# Patient Record
Sex: MALE | Race: BLACK OR AFRICAN AMERICAN
[De-identification: names, ages, dates, MRNs, and addresses within clinical notes are randomized per-mention and may not be internally consistent; named-entity substitution may affect disease eponyms.]

---

## 2021-03-05 ENCOUNTER — HOSPITAL ENCOUNTER (OUTPATIENT)
Dept: HOSPITAL 35 - LAB | Age: 66
End: 2021-03-05
Attending: SURGERY
Payer: COMMERCIAL

## 2021-03-05 DIAGNOSIS — Z01.812: Primary | ICD-10-CM

## 2021-03-05 DIAGNOSIS — Z20.822: ICD-10-CM

## 2021-03-10 ENCOUNTER — HOSPITAL ENCOUNTER (OUTPATIENT)
Dept: HOSPITAL 35 - OR | Age: 66
Discharge: HOME | End: 2021-03-10
Attending: SURGERY
Payer: COMMERCIAL

## 2021-03-10 VITALS — BODY MASS INDEX: 32.34 KG/M2 | HEIGHT: 74 IN | WEIGHT: 252 LBS

## 2021-03-10 VITALS — DIASTOLIC BLOOD PRESSURE: 64 MMHG | SYSTOLIC BLOOD PRESSURE: 117 MMHG

## 2021-03-10 DIAGNOSIS — Z98.890: ICD-10-CM

## 2021-03-10 DIAGNOSIS — Z79.82: ICD-10-CM

## 2021-03-10 DIAGNOSIS — I10: ICD-10-CM

## 2021-03-10 DIAGNOSIS — Z79.899: ICD-10-CM

## 2021-03-10 DIAGNOSIS — E11.9: ICD-10-CM

## 2021-03-10 DIAGNOSIS — K42.0: Primary | ICD-10-CM

## 2021-03-10 DIAGNOSIS — J45.909: ICD-10-CM

## 2021-03-10 DIAGNOSIS — G47.30: ICD-10-CM

## 2021-03-10 DIAGNOSIS — E78.00: ICD-10-CM

## 2021-03-10 LAB
ANION GAP SERPL CALC-SCNC: 6 MMOL/L (ref 7–16)
BUN SERPL-MCNC: 16 MG/DL (ref 7–18)
CALCIUM SERPL-MCNC: 9.1 MG/DL (ref 8.5–10.1)
CHLORIDE SERPL-SCNC: 106 MMOL/L (ref 98–107)
CO2 SERPL-SCNC: 30 MMOL/L (ref 21–32)
CREAT SERPL-MCNC: 1.3 MG/DL (ref 0.7–1.3)
GLUCOSE SERPL-MCNC: 150 MG/DL (ref 74–106)
POTASSIUM SERPL-SCNC: 4.1 MMOL/L (ref 3.5–5.1)
SODIUM SERPL-SCNC: 142 MMOL/L (ref 136–145)

## 2021-03-10 PROCEDURE — 62850: CPT

## 2021-03-10 PROCEDURE — 56524: CPT

## 2021-03-10 PROCEDURE — 56525: CPT

## 2021-03-10 PROCEDURE — 70005: CPT

## 2021-03-10 PROCEDURE — 54118: CPT

## 2021-03-10 PROCEDURE — 56526: CPT

## 2021-03-10 PROCEDURE — 50386 REMOVE STENT VIA TRANSURETH: CPT

## 2021-03-10 PROCEDURE — 50417: CPT

## 2021-03-10 PROCEDURE — 50010 RENAL EXPLORATION: CPT

## 2021-03-10 PROCEDURE — 50130 PYELOTOMY W/REMOVAL CALCULUS: CPT

## 2021-03-10 PROCEDURE — 50101: CPT

## 2021-03-10 PROCEDURE — 62110: CPT

## 2021-03-17 ENCOUNTER — HOSPITAL ENCOUNTER (INPATIENT)
Dept: HOSPITAL 35 - ER | Age: 66
LOS: 5 days | Discharge: HOME | DRG: 853 | End: 2021-03-22
Attending: INTERNAL MEDICINE | Admitting: INTERNAL MEDICINE
Payer: COMMERCIAL

## 2021-03-17 VITALS — DIASTOLIC BLOOD PRESSURE: 71 MMHG | SYSTOLIC BLOOD PRESSURE: 108 MMHG

## 2021-03-17 VITALS — SYSTOLIC BLOOD PRESSURE: 94 MMHG | DIASTOLIC BLOOD PRESSURE: 61 MMHG

## 2021-03-17 VITALS — DIASTOLIC BLOOD PRESSURE: 66 MMHG | SYSTOLIC BLOOD PRESSURE: 95 MMHG

## 2021-03-17 VITALS — DIASTOLIC BLOOD PRESSURE: 55 MMHG | SYSTOLIC BLOOD PRESSURE: 91 MMHG

## 2021-03-17 VITALS — HEIGHT: 75 IN | WEIGHT: 243 LBS | BODY MASS INDEX: 30.21 KG/M2

## 2021-03-17 VITALS — SYSTOLIC BLOOD PRESSURE: 100 MMHG | DIASTOLIC BLOOD PRESSURE: 65 MMHG

## 2021-03-17 VITALS — SYSTOLIC BLOOD PRESSURE: 100 MMHG | DIASTOLIC BLOOD PRESSURE: 66 MMHG

## 2021-03-17 VITALS — SYSTOLIC BLOOD PRESSURE: 112 MMHG | DIASTOLIC BLOOD PRESSURE: 66 MMHG

## 2021-03-17 VITALS — SYSTOLIC BLOOD PRESSURE: 110 MMHG | DIASTOLIC BLOOD PRESSURE: 75 MMHG

## 2021-03-17 VITALS — SYSTOLIC BLOOD PRESSURE: 101 MMHG | DIASTOLIC BLOOD PRESSURE: 63 MMHG

## 2021-03-17 VITALS — SYSTOLIC BLOOD PRESSURE: 115 MMHG | DIASTOLIC BLOOD PRESSURE: 73 MMHG

## 2021-03-17 VITALS — DIASTOLIC BLOOD PRESSURE: 80 MMHG | SYSTOLIC BLOOD PRESSURE: 98 MMHG

## 2021-03-17 DIAGNOSIS — E78.5: ICD-10-CM

## 2021-03-17 DIAGNOSIS — I95.9: ICD-10-CM

## 2021-03-17 DIAGNOSIS — E11.22: ICD-10-CM

## 2021-03-17 DIAGNOSIS — N17.9: ICD-10-CM

## 2021-03-17 DIAGNOSIS — I27.21: ICD-10-CM

## 2021-03-17 DIAGNOSIS — Z79.84: ICD-10-CM

## 2021-03-17 DIAGNOSIS — Z95.5: ICD-10-CM

## 2021-03-17 DIAGNOSIS — D62: ICD-10-CM

## 2021-03-17 DIAGNOSIS — A41.9: Primary | ICD-10-CM

## 2021-03-17 DIAGNOSIS — I26.02: ICD-10-CM

## 2021-03-17 DIAGNOSIS — Z79.82: ICD-10-CM

## 2021-03-17 DIAGNOSIS — J96.01: ICD-10-CM

## 2021-03-17 DIAGNOSIS — K72.00: ICD-10-CM

## 2021-03-17 DIAGNOSIS — E78.00: ICD-10-CM

## 2021-03-17 DIAGNOSIS — I12.9: ICD-10-CM

## 2021-03-17 DIAGNOSIS — R77.8: ICD-10-CM

## 2021-03-17 DIAGNOSIS — N39.0: ICD-10-CM

## 2021-03-17 DIAGNOSIS — I25.10: ICD-10-CM

## 2021-03-17 DIAGNOSIS — Z20.822: ICD-10-CM

## 2021-03-17 DIAGNOSIS — E87.1: ICD-10-CM

## 2021-03-17 DIAGNOSIS — D69.6: ICD-10-CM

## 2021-03-17 DIAGNOSIS — N18.9: ICD-10-CM

## 2021-03-17 DIAGNOSIS — Z79.899: ICD-10-CM

## 2021-03-17 DIAGNOSIS — G47.33: ICD-10-CM

## 2021-03-17 DIAGNOSIS — E87.6: ICD-10-CM

## 2021-03-17 DIAGNOSIS — E87.5: ICD-10-CM

## 2021-03-17 DIAGNOSIS — J45.20: ICD-10-CM

## 2021-03-17 DIAGNOSIS — E83.39: ICD-10-CM

## 2021-03-17 DIAGNOSIS — I08.1: ICD-10-CM

## 2021-03-17 DIAGNOSIS — I82.432: ICD-10-CM

## 2021-03-17 LAB
ALBUMIN SERPL-MCNC: 3.5 G/DL (ref 3.4–5)
ALT SERPL-CCNC: 1743 U/L (ref 16–63)
AMYLASE SERPL-CCNC: 58 U/L (ref 25–115)
ANION GAP SERPL CALC-SCNC: 14 MMOL/L (ref 7–16)
APTT BLD: 25.5 SECONDS (ref 24.5–32.8)
AST SERPL-CCNC: 867 U/L (ref 15–37)
BACTERIA-REFLEX: (no result) /HPF
BASOPHILS NFR BLD AUTO: 0.3 % (ref 0–2)
BILIRUB DIRECT SERPL-MCNC: 0.3 MG/DL
BILIRUB SERPL-MCNC: 0.6 MG/DL (ref 0.2–1)
BILIRUB UR-MCNC: NEGATIVE MG/DL
BUN SERPL-MCNC: 24 MG/DL (ref 7–18)
CALCIUM SERPL-MCNC: 8.8 MG/DL (ref 8.5–10.1)
CHLORIDE SERPL-SCNC: 98 MMOL/L (ref 98–107)
CO2 SERPL-SCNC: 22 MMOL/L (ref 21–32)
COLOR UR: YELLOW
CREAT SERPL-MCNC: 2 MG/DL (ref 0.7–1.3)
EOSINOPHIL NFR BLD: 1.5 % (ref 0–3)
ERYTHROCYTE [DISTWIDTH] IN BLOOD BY AUTOMATED COUNT: 14.1 % (ref 10.5–14.5)
ERYTHROCYTE [DISTWIDTH] IN BLOOD BY AUTOMATED COUNT: 14.2 % (ref 10.5–14.5)
FINE GRAN CASTS #/AREA URNS LPF: (no result) /LPF
GLUCOSE SERPL-MCNC: 305 MG/DL (ref 74–106)
GRANULOCYTES NFR BLD MANUAL: 84.4 % (ref 36–66)
HCT VFR BLD CALC: 33.6 % (ref 42–52)
HCT VFR BLD CALC: 36.4 % (ref 42–52)
HGB BLD-MCNC: 10.5 GM/DL (ref 14–18)
HGB BLD-MCNC: 11.3 GM/DL (ref 14–18)
INR PPP: 1.1
KETONES UR STRIP-MCNC: NEGATIVE MG/DL
LIPASE: 69 U/L (ref 73–393)
LYMPHOCYTES NFR BLD AUTO: 8.1 % (ref 24–44)
MCH RBC QN AUTO: 25.8 PG (ref 26–34)
MCH RBC QN AUTO: 26.3 PG (ref 26–34)
MCHC RBC AUTO-ENTMCNC: 30.9 G/DL (ref 28–37)
MCHC RBC AUTO-ENTMCNC: 31.3 G/DL (ref 28–37)
MCV RBC: 83.3 FL (ref 80–100)
MCV RBC: 83.9 FL (ref 80–100)
MONOCYTES NFR BLD: 5.7 % (ref 1–8)
NEUTROPHILS # BLD: 11.4 THOU/UL (ref 1.4–8.2)
PLATELET # BLD: 175 THOU/UL (ref 150–400)
PLATELET # BLD: 196 THOU/UL (ref 150–400)
POTASSIUM SERPL-SCNC: 3.4 MMOL/L (ref 3.5–5.1)
PROT SERPL-MCNC: 7.4 G/DL (ref 6.4–8.2)
PROTHROMBIN TIME: 11.4 SECONDS (ref 9.3–11.4)
RBC # BLD AUTO: 4 MIL/UL (ref 4.5–6)
RBC # BLD AUTO: 4.37 MIL/UL (ref 4.5–6)
RBC # UR STRIP: (no result) /UL
SODIUM SERPL-SCNC: 134 MMOL/L (ref 136–145)
SP GR UR STRIP: 1.02 (ref 1–1.03)
SQUAMOUS: (no result) /LPF (ref 0–3)
TROPONIN I SERPL-MCNC: 0.2 NG/ML (ref ?–0.06)
URINE CLARITY: CLEAR
URINE GLUCOSE-RANDOM*: (no result)
URINE LEUKOCYTES-REFLEX: NEGATIVE
URINE NITRITE-REFLEX: NEGATIVE
URINE PROTEIN (DIPSTICK): (no result)
URINE WBC-REFLEX: (no result) /HPF (ref 0–5)
UROBILINOGEN UR STRIP-ACNC: 4 E.U./DL (ref 0.2–1)
WBC # BLD AUTO: 10.1 THOU/UL (ref 4–11)
WBC # BLD AUTO: 13.5 THOU/UL (ref 4–11)

## 2021-03-17 PROCEDURE — 02CQ3ZZ EXTIRPATION OF MATTER FROM RIGHT PULMONARY ARTERY, PERCUTANEOUS APPROACH: ICD-10-PCS

## 2021-03-17 PROCEDURE — 10078: CPT

## 2021-03-17 PROCEDURE — 02CP3ZZ EXTIRPATION OF MATTER FROM PULMONARY TRUNK, PERCUTANEOUS APPROACH: ICD-10-PCS | Performed by: RADIOLOGY

## 2021-03-17 PROCEDURE — 02CR3ZZ EXTIRPATION OF MATTER FROM LEFT PULMONARY ARTERY, PERCUTANEOUS APPROACH: ICD-10-PCS

## 2021-03-17 PROCEDURE — 10081 I&D PILONIDAL CYST COMP: CPT

## 2021-03-17 PROCEDURE — B31S1ZZ FLUOROSCOPY OF RIGHT PULMONARY ARTERY USING LOW OSMOLAR CONTRAST: ICD-10-PCS

## 2021-03-17 PROCEDURE — B31T1ZZ FLUOROSCOPY OF LEFT PULMONARY ARTERY USING LOW OSMOLAR CONTRAST: ICD-10-PCS

## 2021-03-18 VITALS — DIASTOLIC BLOOD PRESSURE: 87 MMHG | SYSTOLIC BLOOD PRESSURE: 148 MMHG

## 2021-03-18 VITALS — SYSTOLIC BLOOD PRESSURE: 87 MMHG | DIASTOLIC BLOOD PRESSURE: 51 MMHG

## 2021-03-18 VITALS — DIASTOLIC BLOOD PRESSURE: 81 MMHG | SYSTOLIC BLOOD PRESSURE: 126 MMHG

## 2021-03-18 VITALS — DIASTOLIC BLOOD PRESSURE: 79 MMHG | SYSTOLIC BLOOD PRESSURE: 125 MMHG

## 2021-03-18 VITALS — DIASTOLIC BLOOD PRESSURE: 76 MMHG | SYSTOLIC BLOOD PRESSURE: 119 MMHG

## 2021-03-18 VITALS — DIASTOLIC BLOOD PRESSURE: 68 MMHG | SYSTOLIC BLOOD PRESSURE: 104 MMHG

## 2021-03-18 VITALS — DIASTOLIC BLOOD PRESSURE: 65 MMHG | SYSTOLIC BLOOD PRESSURE: 100 MMHG

## 2021-03-18 VITALS — SYSTOLIC BLOOD PRESSURE: 119 MMHG | DIASTOLIC BLOOD PRESSURE: 79 MMHG

## 2021-03-18 VITALS — DIASTOLIC BLOOD PRESSURE: 79 MMHG | SYSTOLIC BLOOD PRESSURE: 129 MMHG

## 2021-03-18 VITALS — DIASTOLIC BLOOD PRESSURE: 80 MMHG | SYSTOLIC BLOOD PRESSURE: 153 MMHG

## 2021-03-18 VITALS — DIASTOLIC BLOOD PRESSURE: 61 MMHG | SYSTOLIC BLOOD PRESSURE: 112 MMHG

## 2021-03-18 VITALS — SYSTOLIC BLOOD PRESSURE: 97 MMHG | DIASTOLIC BLOOD PRESSURE: 63 MMHG

## 2021-03-18 VITALS — SYSTOLIC BLOOD PRESSURE: 106 MMHG | DIASTOLIC BLOOD PRESSURE: 69 MMHG

## 2021-03-18 VITALS — SYSTOLIC BLOOD PRESSURE: 130 MMHG | DIASTOLIC BLOOD PRESSURE: 66 MMHG

## 2021-03-18 VITALS — SYSTOLIC BLOOD PRESSURE: 121 MMHG | DIASTOLIC BLOOD PRESSURE: 83 MMHG

## 2021-03-18 VITALS — SYSTOLIC BLOOD PRESSURE: 136 MMHG | DIASTOLIC BLOOD PRESSURE: 80 MMHG

## 2021-03-18 VITALS — SYSTOLIC BLOOD PRESSURE: 99 MMHG | DIASTOLIC BLOOD PRESSURE: 70 MMHG

## 2021-03-18 VITALS — SYSTOLIC BLOOD PRESSURE: 111 MMHG | DIASTOLIC BLOOD PRESSURE: 77 MMHG

## 2021-03-18 VITALS — DIASTOLIC BLOOD PRESSURE: 61 MMHG | SYSTOLIC BLOOD PRESSURE: 109 MMHG

## 2021-03-18 VITALS — SYSTOLIC BLOOD PRESSURE: 124 MMHG | DIASTOLIC BLOOD PRESSURE: 80 MMHG

## 2021-03-18 VITALS — DIASTOLIC BLOOD PRESSURE: 64 MMHG | SYSTOLIC BLOOD PRESSURE: 107 MMHG

## 2021-03-18 VITALS — SYSTOLIC BLOOD PRESSURE: 103 MMHG | DIASTOLIC BLOOD PRESSURE: 64 MMHG

## 2021-03-18 VITALS — DIASTOLIC BLOOD PRESSURE: 82 MMHG | SYSTOLIC BLOOD PRESSURE: 126 MMHG

## 2021-03-18 VITALS — DIASTOLIC BLOOD PRESSURE: 65 MMHG | SYSTOLIC BLOOD PRESSURE: 105 MMHG

## 2021-03-18 VITALS — DIASTOLIC BLOOD PRESSURE: 52 MMHG | SYSTOLIC BLOOD PRESSURE: 105 MMHG

## 2021-03-18 VITALS — DIASTOLIC BLOOD PRESSURE: 54 MMHG | SYSTOLIC BLOOD PRESSURE: 99 MMHG

## 2021-03-18 VITALS — DIASTOLIC BLOOD PRESSURE: 62 MMHG | SYSTOLIC BLOOD PRESSURE: 99 MMHG

## 2021-03-18 VITALS — DIASTOLIC BLOOD PRESSURE: 67 MMHG | SYSTOLIC BLOOD PRESSURE: 121 MMHG

## 2021-03-18 VITALS — DIASTOLIC BLOOD PRESSURE: 69 MMHG | SYSTOLIC BLOOD PRESSURE: 128 MMHG

## 2021-03-18 VITALS — DIASTOLIC BLOOD PRESSURE: 76 MMHG | SYSTOLIC BLOOD PRESSURE: 125 MMHG

## 2021-03-18 VITALS — SYSTOLIC BLOOD PRESSURE: 102 MMHG | DIASTOLIC BLOOD PRESSURE: 59 MMHG

## 2021-03-18 VITALS — DIASTOLIC BLOOD PRESSURE: 73 MMHG | SYSTOLIC BLOOD PRESSURE: 125 MMHG

## 2021-03-18 VITALS — SYSTOLIC BLOOD PRESSURE: 106 MMHG | DIASTOLIC BLOOD PRESSURE: 62 MMHG

## 2021-03-18 VITALS — SYSTOLIC BLOOD PRESSURE: 121 MMHG | DIASTOLIC BLOOD PRESSURE: 77 MMHG

## 2021-03-18 VITALS — SYSTOLIC BLOOD PRESSURE: 131 MMHG | DIASTOLIC BLOOD PRESSURE: 86 MMHG

## 2021-03-18 VITALS — SYSTOLIC BLOOD PRESSURE: 118 MMHG | DIASTOLIC BLOOD PRESSURE: 74 MMHG

## 2021-03-18 VITALS — SYSTOLIC BLOOD PRESSURE: 118 MMHG | DIASTOLIC BLOOD PRESSURE: 79 MMHG

## 2021-03-18 VITALS — SYSTOLIC BLOOD PRESSURE: 106 MMHG | DIASTOLIC BLOOD PRESSURE: 55 MMHG

## 2021-03-18 VITALS — SYSTOLIC BLOOD PRESSURE: 105 MMHG | DIASTOLIC BLOOD PRESSURE: 74 MMHG

## 2021-03-18 VITALS — SYSTOLIC BLOOD PRESSURE: 110 MMHG | DIASTOLIC BLOOD PRESSURE: 73 MMHG

## 2021-03-18 VITALS — DIASTOLIC BLOOD PRESSURE: 63 MMHG | SYSTOLIC BLOOD PRESSURE: 88 MMHG

## 2021-03-18 VITALS — DIASTOLIC BLOOD PRESSURE: 62 MMHG | SYSTOLIC BLOOD PRESSURE: 101 MMHG

## 2021-03-18 VITALS — DIASTOLIC BLOOD PRESSURE: 69 MMHG | SYSTOLIC BLOOD PRESSURE: 113 MMHG

## 2021-03-18 VITALS — DIASTOLIC BLOOD PRESSURE: 74 MMHG | SYSTOLIC BLOOD PRESSURE: 125 MMHG

## 2021-03-18 LAB
ALBUMIN SERPL-MCNC: 3.4 G/DL (ref 3.4–5)
ALT SERPL-CCNC: 1504 U/L (ref 16–63)
ANION GAP SERPL CALC-SCNC: 9 MMOL/L (ref 7–16)
AST SERPL-CCNC: 655 U/L (ref 15–37)
BILIRUB DIRECT SERPL-MCNC: 0.2 MG/DL
BILIRUB SERPL-MCNC: 0.4 MG/DL (ref 0.2–1)
BUN SERPL-MCNC: 25 MG/DL (ref 7–18)
CALCIUM SERPL-MCNC: 8 MG/DL (ref 8.5–10.1)
CHLORIDE SERPL-SCNC: 103 MMOL/L (ref 98–107)
CO2 SERPL-SCNC: 21 MMOL/L (ref 21–32)
CREAT SERPL-MCNC: 1.7 MG/DL (ref 0.7–1.3)
ERYTHROCYTE [DISTWIDTH] IN BLOOD BY AUTOMATED COUNT: 13.8 % (ref 10.5–14.5)
ERYTHROCYTE [DISTWIDTH] IN BLOOD BY AUTOMATED COUNT: 14.7 % (ref 10.5–14.5)
GLUCOSE SERPL-MCNC: 161 MG/DL (ref 74–106)
HCT VFR BLD CALC: 29 % (ref 42–52)
HCT VFR BLD CALC: 32.4 % (ref 42–52)
HGB BLD-MCNC: 10.1 GM/DL (ref 14–18)
HGB BLD-MCNC: 9.2 GM/DL (ref 14–18)
MCH RBC QN AUTO: 26.4 PG (ref 26–34)
MCH RBC QN AUTO: 26.5 PG (ref 26–34)
MCHC RBC AUTO-ENTMCNC: 31.1 G/DL (ref 28–37)
MCHC RBC AUTO-ENTMCNC: 31.8 G/DL (ref 28–37)
MCV RBC: 83.1 FL (ref 80–100)
MCV RBC: 84.7 FL (ref 80–100)
PLATELET # BLD: 127 THOU/UL (ref 150–400)
PLATELET # BLD: 140 THOU/UL (ref 150–400)
POTASSIUM SERPL-SCNC: 5.4 MMOL/L (ref 3.5–5.1)
PROT SERPL-MCNC: 6.9 G/DL (ref 6.4–8.2)
RBC # BLD AUTO: 3.49 MIL/UL (ref 4.5–6)
RBC # BLD AUTO: 3.82 MIL/UL (ref 4.5–6)
SODIUM SERPL-SCNC: 133 MMOL/L (ref 136–145)
TROPONIN I SERPL-MCNC: 1.95 NG/ML (ref ?–0.06)
WBC # BLD AUTO: 8.6 THOU/UL (ref 4–11)
WBC # BLD AUTO: 9.8 THOU/UL (ref 4–11)

## 2021-03-18 PROCEDURE — 5A09357 ASSISTANCE WITH RESPIRATORY VENTILATION, LESS THAN 24 CONSECUTIVE HOURS, CONTINUOUS POSITIVE AIRWAY PRESSURE: ICD-10-PCS | Performed by: INTERNAL MEDICINE

## 2021-03-18 NOTE — NUR
chart review, cm visited with pt and his wife at bedside, cm cont to wear face
mask and shield during visit. pt able to make his needs know, a & o x4. haider
reported he is independent, manage own medication, drives vehicle. live in
house with his wife, about 12 steps up from basement " even after last surgery
was able to walk up to get into home with out diff. use cpap to sleep. no hh
or rehab in past"/haider. will cont following as needed for dc needs. discussed
during am rounds.

## 2021-03-18 NOTE — EKG
[address]  [address]
Phone:  [phone]                    ELECTROCARDIOGRAM REPORT      
_______________________________________________________________________________
 
Name:       VALERY AVENDAÑO                Room #:         236-P       ADM IN  
M.R.#:      [id number]     Account #:      [id number]  
Admission:  21    Attend Phys:    Priyanka Dias MD   
Discharge:              Date of Birth:  [birth year]  
                                                          Report #: [id number]
   [id number]
_______________________________________________________________________________
                         [facility] ED
                                       
Test Date:    2021               Test Time:    16:01:22
Pat Name:     VALERY AVENDAÑO             Department:   
Patient ID:   [id number]            Room:         236
Gender:       M                        Technician:   BRANDON
:          1955               Requested By: Dawson Goins
Order Number: [id number] MD:   Terrence Cantu
                                 Measurements
Intervals                              Axis          
Rate:         115                      P:            65
NC:           139                      QRS:          -36
QRSD:         91                       T:            92
QT:           356                                    
QTc:          493                                    
                           Interpretive Statements
Sinus tachycardia
Abnormal R-wave progression, early transition
Probable left ventricular hypertrophy
Ischemic changes lateral leads
BOrderline ST elevation V1-2
Borderline prolonged QT interval
Compared to ECG 2010 20:40:54
Sinus rhythm no longer present
Electronically Signed On 3- 7:17:31 CDT by Terrence Cantu
[email or web address]
 
 
 
 
 
 
 
 
 
 
 
 
 
 
 
 
 
  <ELECTRONICALLY SIGNED>
   By: Terrence Cantu MD, FACC    
  21     0717
D: 21 1601                           _____________________________________
T: 21 1601                           Terrence Cantu MD, [facility]      /EPI

## 2021-03-18 NOTE — NUR
PT ARRIVED TO ICU  AT 2115. THIS RN ASSUMED CARE AT THIS TIME. PT IS
AXOX4, WEARING 2L NC, AND ON BEDREST POST IR PROCEDURE. WILL FOLLOW PLAN OF
CARE AND CONTINUE TO MONITOR.

## 2021-03-18 NOTE — NUR
ASSUMED CARE OF PATIENT AT 1400, ALERT AND ORIENTED. STABL VSS. HEPARIN GTT
INFUSING. APPT THERAPEUTIC. REDRAW SCHEDULED FOR AM. CONSULT CARDIOLOGY FOR
SARAH IN AM. NPO STARTS AT MIDNOC. PATIENT TRANSFERED TO CCU ROOM 204.

## 2021-03-18 NOTE — EKG
[place] [address] [facility] [employer]
[place]  [address]
Phone:  [phone]                    ELECTROCARDIOGRAM REPORT      
_______________________________________________________________________________
 
Name:       VALERY AVENDAÑO                Room #:         236-P       ADM IN  
M.R.#:      [id number]     Account #:      [id number]  
Admission:  21    Attend Phys:    Priyanka Dias MD   
Discharge:              Date of Birth:  [birth year]  
                                                          Report #: [id number]
   [id number]
_______________________________________________________________________________
                          [facility]
                                       
Test Date:    2021               Test Time:    07:37:14
Pat Name:     VALERY AVENDAÑO             Department:   
Patient ID:   [id number]            Room:         236 P
Gender:       M                        Technician:   LANA
:          1955               Requested By: Pedro Goodson
Order Number: [id number] MD:   Diego Mario
                                 Measurements
Intervals                              Axis          
Rate:         90                       P:            39
NC:           139                      QRS:          -31
QRSD:         89                       T:            -5
QT:           395                                    
QTc:          484                                    
                           Interpretive Statements
Sinus rhythm
Abnormal R-wave progression, early transition
Leftward axis
Nonspecific T wave abnormality
Compared to ECG 2021 16:01:22
ST and T wave abnormality most prominent
Sinus tachycardia no longer present
Electronically Signed On 3- 9:00:56 CDT by Diego Mario
[email or web address]
 
 
 
 
 
 
 
 
 
 
 
 
 
 
 
 
 
 
  <ELECTRONICALLY SIGNED>
   By: Diego Mario MD, [facility]   
  21     0900
D: 21 0737                           _____________________________________
T: 21 0737                           Diego Mario MD, [facility]     /EPI

## 2021-03-18 NOTE — 2DMMODE
[facility]
[address]
[place]   [address]                   2 D/M-MODE ECHOCARDIOGRAM     
_______________________________________________________________________________
 
Name:       VALERY AVENDAÑO                Room #:         236-P       ADM IN  
M.R.#:      [id number]                       Account #:      [id number]  
Admission:  21    Attend Phys:    Priyanka Dias MD   
Discharge:              Date of Birth:  [birth year]  
                                                          Report #: [id number]
                                                                    [id number]
_______________________________________________________________________________
THIS REPORT FOR:  
 
cc:  Elizabeth Keys MD, Cora A. MD Lammoglia, Francisco J. MD                                        
                                                                       ~
 
--------------- APPROVED REPORT --------------
 
 
Study performed:  2021 08:13:45
 
EXAM: Comprehensive 2D, Doppler, and color-flow 
Echocardiogram 
Patient Location: ICU    
Room #:  236     Status:  routine
 
      BSA:         2.39
HR: 86 bpm BP:          124/80 mmHg 
Rhythm: NSR     
 
Other Information 
Study Quality: Good
 
Indications
Pulmonary Embolism
Diabetes
Dyspnea 
CAD
Hypertension/HDD
 
2D Dimensions
RVDd:  57.13 mm   
IVSd:  9.84 (7-11mm)  LVOT Diam:  22.72 (18-24mm) 
LVDd:  36.01 mm   
PWd:  9.13 (7-11mm)  Ascending Ao:  32.73 (22-36mm)
LVDs:  25.19 (25-40mm)  
Left Atrium:  31.76 (27-40mm) 
Aortic Root:  33.28 mm  IVC:  28.00 mm
 
Volumes
Left Atrial Volume (Systole) 
Single Plane 4CH:  36.05 mL Single Plane 2CH:  39.95 mL
    LA ESV Index:  18.00 mL/m2
 
Aortic Valve
 
 
[facility]
[address]
[place]  [address]
Phone:  [phone]                    2 D/M-MODE ECHOCARDIOGRAM     
_______________________________________________________________________________
 
Name:            VALERY AVENDAÑO                Room #:        236-P       [facility] [place]
M.R.#:           [id number]          Account #:     [id number]  
Admission:       21         Attend Phys:   RUMA Cee
Discharge:                  Date of Birth: [birth year]  
                         Report #:      [id number]
        [id number]
_______________________________________________________________________________
AoV Peak Hany.:  0.86 m/s 
AO Peak Gr.:  2.94 mmHg  LVOT Max P.79 mmHg
    LVOT Max V:  0.83 m/s
OFELIA Vmax: 3.94 cm2  
 
Mitral Valve
    E/A Ratio:  0.8
    MV Decel. Time:  215.39 ms
MV E Max Hany.:  0.56 m/s 
MV A Hany.:  0.70 m/s  
MV PHT:  62.46 ms  
IVRT:  129.18 ms  
 
Pulmonary Valve
PV Peak Hany.:  0.77 m/s PV Peak Gr.:  2.36 mmHg
 
Pulmonary Vein
P Vein S:    0.40 m/s P Vein A:  0.21 m/s
P Vein D:   0.30 m/s P Vein A Dur.:  101.5 msec
P Vein S/D Ratio:  1.33 
 
Tricuspid Valve
TR Peak Hany.:  2.52 m/s  
TR Peak Gr.:  25.43 mmHg 
    PA Pressure:  40.00 mmHg
 
Left Ventricle
The left ventricle is normal size. There is normal LV segmental wall 
motion. There is normal left ventricular wall thickness. The left 
ventricular systolic function is normal. The left ventricular 
ejection fraction is within the normal range. LVEF is 55-60%. Grade I 
- abnormal relaxation pattern.
 
Right Ventricle
Right ventricle is dilated. The right ventricle free wall is mildly 
hypertrophied. Right ventricle is hypokinetic.
 
Atria
The left atrium size is normal. Right atrium is dilated. There is a 
mobile stranding in the right atrium likely a Chiari network. IF 
CLINICALLY RELEVANT CONSIDER SARAH FOR FURTHER CHARACTERIZATION
 
Aortic Valve
The aortic valve is normal in structure. No aortic regurgitation is 
present. There is no aortic valvular stenosis.
 
 
 
[facility]
[address]
[place] [address]
Phone:  [phone]                    2 D/M-MODE ECHOCARDIOGRAM     
_______________________________________________________________________________
 
Name:            VALERY AVENDAÑO                Room #:        236-P       [facility] [place]
[facility].#:           [id number]          Account #:     [id number]  
Admission:       21         Attend Phys:   RUMA Cee
Discharge:                  Date of Birth: [birth year]  
                         Report #:      [id number]
        [id number]
_______________________________________________________________________________
Mitral Valve
The mitral valve is normal in structure. Trace mitral regurgitation. 
No evidence of mitral valve stenosis.
 
Tricuspid Valve
The tricuspid valve is normal in structure. There is mild tricuspid 
regurgitation. Estimated PAP 40 mmHg. There is mild-moderate 
pulmonary hypertension.
 
Pulmonic Valve
The pulmonary valve is normal in structure. Trace pulmonic 
regurgitation.
 
Great Vessels
The aortic root is normal in size. The inferior vena cava is dilated 
with no inspiratory collapse.
 
Pericardium
There is no pericardial effusion.
 
<Conclusion>
The left ventricle is normal size.
LVEF is 55-60%.
Right ventricle is dilated.
Right ventricle is hypokinetic.
The right ventricle free wall is mildly hypertrophied.
Right atrium is dilated.
There is a mobile stranding in the right atrium likely a Chiari 
network. IF CLINICALLY RELEVANT CONSIDER SARAH FOR FURTHER 
CHARACTERIZATION
The aortic valve is normal in structure.
The mitral valve is normal in structure.
Trace mitral regurgitation.
The tricuspid valve is normal in structure.
There is mild tricuspid regurgitation. Estimated PAP 40  mmHg.
There is mild-moderate pulmonary hypertension.
The pulmonary valve is normal in structure.
Trace pulmonic regurgitation.
There is no pericardial effusion.
 
 
 
 
 
  <ELECTRONICALLY SIGNED>
   By: Nguyễn Bills MD    
  21     1021
D: 21 1021                           _____________________________________
T: 21 1021                           Nguyễn Bills MD      /INF

## 2021-03-19 VITALS — DIASTOLIC BLOOD PRESSURE: 87 MMHG | SYSTOLIC BLOOD PRESSURE: 136 MMHG

## 2021-03-19 VITALS — SYSTOLIC BLOOD PRESSURE: 149 MMHG | DIASTOLIC BLOOD PRESSURE: 88 MMHG

## 2021-03-19 VITALS — SYSTOLIC BLOOD PRESSURE: 137 MMHG | DIASTOLIC BLOOD PRESSURE: 86 MMHG

## 2021-03-19 LAB
ANION GAP SERPL CALC-SCNC: 9 MMOL/L (ref 7–16)
BUN SERPL-MCNC: 19 MG/DL (ref 7–18)
CALCIUM SERPL-MCNC: 8.7 MG/DL (ref 8.5–10.1)
CHLORIDE SERPL-SCNC: 106 MMOL/L (ref 98–107)
CO2 SERPL-SCNC: 23 MMOL/L (ref 21–32)
CREAT SERPL-MCNC: 1.4 MG/DL (ref 0.7–1.3)
ERYTHROCYTE [DISTWIDTH] IN BLOOD BY AUTOMATED COUNT: 14.1 % (ref 10.5–14.5)
GLUCOSE SERPL-MCNC: 124 MG/DL (ref 74–106)
HCT VFR BLD CALC: 29.7 % (ref 42–52)
HGB BLD-MCNC: 9.6 GM/DL (ref 14–18)
IRON SERPL-MCNC: 38 UG/DL (ref 65–175)
MCH RBC QN AUTO: 26.8 PG (ref 26–34)
MCHC RBC AUTO-ENTMCNC: 32.3 G/DL (ref 28–37)
MCV RBC: 82.9 FL (ref 80–100)
PLATELET # BLD: 132 THOU/UL (ref 150–400)
POTASSIUM SERPL-SCNC: 4.2 MMOL/L (ref 3.5–5.1)
RBC # BLD AUTO: 3.58 MIL/UL (ref 4.5–6)
SAO2 % BLD FROM PO2: 16 % (ref 20–39)
SODIUM SERPL-SCNC: 138 MMOL/L (ref 136–145)
TIBC SERPL-MCNC: 245 UG/DL (ref 250–450)
WBC # BLD AUTO: 8.2 THOU/UL (ref 4–11)

## 2021-03-19 PROCEDURE — 5A09357 ASSISTANCE WITH RESPIRATORY VENTILATION, LESS THAN 24 CONSECUTIVE HOURS, CONTINUOUS POSITIVE AIRWAY PRESSURE: ICD-10-PCS | Performed by: INTERNAL MEDICINE

## 2021-03-19 PROCEDURE — B24BZZ4 ULTRASONOGRAPHY OF HEART WITH AORTA, TRANSESOPHAGEAL: ICD-10-PCS | Performed by: INTERNAL MEDICINE

## 2021-03-19 NOTE — NUR
PT TRANSFERRED BACK TO ROOM 204 AT 0900 IN BED BY RN. VSS. HR 78: /61;
R 17; SAT 95% ON RA. PT AWAKE AND ALERT. TOLERATED SARAH WELL. DRANK WATER
WITHOUT DIFFICULTY. REPORT TO [facility]. 300 CC NS INFUSED

## 2021-03-19 NOTE — TEE
[facility]
[address]
[place]   [address]                   TRANSESOPHAGEAL ECHOCARDIOGRAM
_______________________________________________________________________________
 
Name:       VALERY AVENDAÑO                Room #:         204-P       [facility] [place]  
M.R.#:      [id number]                       Account #:      [id number]  
Admission:  03/17/21    Attend Phys:    Priyanka Dias MD   
Discharge:              Date of Birth:  [birth year]  
                                                          Report #: [id number]
                                                                    [id number]
_______________________________________________________________________________
THIS REPORT FOR:  
 
cc:  Elizabeth Keys MD, Cora A. MD Santiago, Patrick MD [facility]                                         
                                                                       ~
 
--------------- APPROVED REPORT --------------
 
 
Study performed:  03/19/2021 07:48:21
 
EXAM: Comprehensive 2D, Doppler, and color-flow 
Echocardiogram 
Patient Location: In-Patient   
Room #:  9     
      Status:  routine
 
      BSA:         2.39
HR: 85 bpm BP:          145/81 mmHg 
Rhythm: NSR     
 
Other Information 
Study Quality: Good
 
Indications
Thrombus 
 
Echo Enhancing Agent
Indication: Rule out Shunt
Agent(s) / Amount(s) Used: Agitated Saline 7 cc
 
Procedure
After obtaining informed consent, patient underwent transesophageal 
echo in the [facility]. 
Type of Sedation : Conscious Sedation
Sedation was administered by Nurse. 
Sedation was achieved intravenously with:  Versed (4 mg)    Fentanyl 
(50 mcg)    
Transesophageal probe was inserted and advanced into esophagus 
without difficulty by Terrence Cantu MD.
Echo enhancement indication: R/O Septal defect.
Echo enhancement agent administered: Agitated Saline
The SARAH was performed without complications. 
Throughout the procedure, the blood pressure, pulse oximetry, cardiac 
rhythm, and rate were monitored.
 
 
[facility]
[address]
[place]  [address]
Phone:  [phone]                    TRANSESOPHAGEAL ECHOCARDIOGRAM
_______________________________________________________________________________
 
Name:            VALERY AVENDAÑO JR               Room #:        204-P       ADM IN
M.R.#:           [id number]          Account #:     [id number]  
Admission:       03/17/21         Attend Phys:   RUMA Cee
Discharge:                  Date of Birth: [birth year]  
                         Report #:      [id number]
        [id number]
_______________________________________________________________________________
The patient tolerated the procedure without adverse effects. Recovery 
from conscious sedation was uneventful and vital signs were 
stable.
 
Left Ventricle
The left ventricle is normal size. There is normal LV segmental wall 
motion. There is normal left ventricular wall thickness. The left 
ventricular systolic function is normal. The left ventricular 
ejection fraction is within the normal range. LVEF is 55-60%.
 
Right Ventricle
Right ventricle is dilated. Right ventricle is 
hypokinetic.
 
Atria
The left atrium size is normal. Interatrial septum is intact without 
evidence of ASD or PFO. Right atrium is dilated. Chiari network vs 
thrombus is noted in the right atrium.
 
Aortic Valve
The aortic valve is normal in structure. No aortic regurgitation is 
present. There is no aortic valvular stenosis.
 
Mitral Valve
The mitral valve is normal in structure. Trace mitral regurgitation. 
No evidence of mitral valve stenosis.
 
Tricuspid Valve
The tricuspid valve is normal in structure. Mild tricuspid 
regurgitation.
 
Pulmonic Valve
The pulmonary valve is normal in structure. There is no pulmonic 
valvular regurgitation.
 
Great Vessels
The aortic root is normal in size. IVC is dilated.
 
Pericardium
There is no pericardial effusion.
 
<Conclusion>
Consent was obtained
Esophageal probe was advanced without difficulty
Normal left ventricular size/wall thickness
Ejection fraction 55%
 
 
[facility]
[address]
[place]  [address]
Phone:  [phone]                    TRANSESOPHAGEAL ECHOCARDIOGRAM
_______________________________________________________________________________
 
Name:            VALERY AVENDAÑO                Room #:        204-P       ADM IN
M.R.#:           [id number]          Account #:     [id number]  
Admission:       03/17/21         Attend Phys:   RUMA Cee
Discharge:                  Date of Birth: [birth year]  
                         Report #:      [id number]
        [id number]
_______________________________________________________________________________
Normal atrial size
Right ventricle mildly dilated/hypokinetic
Normal aortic valve structure and function
Normal mitral valve structure and function
Left atrial appendage, no obvious mass or clot detected
No evidence of tricuspid valve insufficiency
***Linear mobile echodensity in the high right atrium with apparent 
insertion  in the vena cava***
***This mobile echodensity suspicious for clot, - 2 cm in length and 
0.6 cm in width***
Aorta: no calcification detected
No evidence of ASD/VSD by color-flow Doppler study.
Patient tolerate procedure well
 
 
 
 
 
 
 
 
 
 
 
 
 
 
 
 
 
 
 
 
 
 
 
 
 
 
 
 
 
 
 
  <ELECTRONICALLY SIGNED>
   By: Terrence Cantu MD, FACC    
  03/19/21 1218
D: 03/19/21 1218                           _____________________________________
T: 03/19/21 1218                           Terrence Cantu MD, FACC      /INF

## 2021-03-19 NOTE — NUR
ASSUMED PT CARE AT 0700. PT WAS PREPARING TO GO FOR HIS SARAH. PT NOTIFIED WIFE
OF PLAN OF CARE FOR THE DAY THROUGH TEXT. PT RETURNED FROM HIS SARAH AT 0900. PT
DENIES PAIN. PT WENT FOR ABD ULTRASOUND AT 0915. VSS. WILL CONTINUE TO
MONITOR.

## 2021-03-20 VITALS — DIASTOLIC BLOOD PRESSURE: 62 MMHG | SYSTOLIC BLOOD PRESSURE: 109 MMHG

## 2021-03-20 VITALS — DIASTOLIC BLOOD PRESSURE: 79 MMHG | SYSTOLIC BLOOD PRESSURE: 152 MMHG

## 2021-03-20 VITALS — SYSTOLIC BLOOD PRESSURE: 124 MMHG | DIASTOLIC BLOOD PRESSURE: 59 MMHG

## 2021-03-20 VITALS — DIASTOLIC BLOOD PRESSURE: 58 MMHG | SYSTOLIC BLOOD PRESSURE: 102 MMHG

## 2021-03-20 VITALS — DIASTOLIC BLOOD PRESSURE: 82 MMHG | SYSTOLIC BLOOD PRESSURE: 129 MMHG

## 2021-03-20 LAB
ALBUMIN SERPL-MCNC: 3.2 G/DL (ref 3.4–5)
ALT SERPL-CCNC: 915 U/L (ref 16–63)
ANION GAP SERPL CALC-SCNC: 12 MMOL/L (ref 7–16)
AST SERPL-CCNC: 181 U/L (ref 15–37)
BASOPHILS NFR BLD AUTO: 0 % (ref 0–2)
BILIRUB SERPL-MCNC: 0.5 MG/DL (ref 0.2–1)
BUN SERPL-MCNC: 16 MG/DL (ref 7–18)
CALCIUM SERPL-MCNC: 8.9 MG/DL (ref 8.5–10.1)
CHLORIDE SERPL-SCNC: 102 MMOL/L (ref 98–107)
CO2 SERPL-SCNC: 24 MMOL/L (ref 21–32)
CREAT SERPL-MCNC: 1.4 MG/DL (ref 0.7–1.3)
EOSINOPHIL NFR BLD: 5 % (ref 0–3)
ERYTHROCYTE [DISTWIDTH] IN BLOOD BY AUTOMATED COUNT: 13.9 % (ref 10.5–14.5)
GLUCOSE SERPL-MCNC: 141 MG/DL (ref 74–106)
GRANULOCYTES NFR BLD MANUAL: 73 % (ref 36–66)
HCT VFR BLD CALC: 31.1 % (ref 42–52)
HGB BLD-MCNC: 10 GM/DL (ref 14–18)
LYMPHOCYTES NFR BLD AUTO: 11 % (ref 24–44)
MAGNESIUM SERPL-MCNC: 1.9 MG/DL (ref 1.8–2.4)
MCH RBC QN AUTO: 26.6 PG (ref 26–34)
MCHC RBC AUTO-ENTMCNC: 32.1 G/DL (ref 28–37)
MCV RBC: 82.9 FL (ref 80–100)
MONOCYTES NFR BLD: 11 % (ref 1–8)
NEUTROPHILS # BLD: 6.8 THOU/UL (ref 1.4–8.2)
PHOSPHATE SERPL-MCNC: 2.5 MG/DL (ref 2.6–4.7)
PLATELET # BLD EST: NORMAL 10*3/UL
PLATELET # BLD: 175 THOU/UL (ref 150–400)
POTASSIUM SERPL-SCNC: 4 MMOL/L (ref 3.5–5.1)
PROT SERPL-MCNC: 6.8 G/DL (ref 6.4–8.2)
RBC # BLD AUTO: 3.75 MIL/UL (ref 4.5–6)
RBC MORPH BLD: NORMAL
SODIUM SERPL-SCNC: 138 MMOL/L (ref 136–145)
WBC # BLD AUTO: 9.3 THOU/UL (ref 4–11)

## 2021-03-20 NOTE — NUR
PT SLEPT ON AND OFF THRU THE NOC, HEPARIN GTT TITRATED PER DVT PROTOCOL, PT UP
ADLIB IN ROOM, ASSESSMENT AS CHARTED, VSS, NO C/O PAIN, WILL CON'T TO MONITOR
PER PPOC.

## 2021-03-20 NOTE — NUR
PT SITTING UP IN CHAIR FACETIMING FAMILY. VSS. NOON ASSESSMENT PERFORMED. VSS.
WILL CONTINUE TO MONITOR.

## 2021-03-21 VITALS — SYSTOLIC BLOOD PRESSURE: 131 MMHG | DIASTOLIC BLOOD PRESSURE: 64 MMHG

## 2021-03-21 VITALS — DIASTOLIC BLOOD PRESSURE: 66 MMHG | SYSTOLIC BLOOD PRESSURE: 127 MMHG

## 2021-03-21 VITALS — DIASTOLIC BLOOD PRESSURE: 67 MMHG | SYSTOLIC BLOOD PRESSURE: 139 MMHG

## 2021-03-21 VITALS — DIASTOLIC BLOOD PRESSURE: 61 MMHG | SYSTOLIC BLOOD PRESSURE: 118 MMHG

## 2021-03-21 VITALS — DIASTOLIC BLOOD PRESSURE: 72 MMHG | SYSTOLIC BLOOD PRESSURE: 135 MMHG

## 2021-03-21 LAB
ABSOLUTE RETIC COUNT: 0.12 10^6/UL
CHOLEST SERPL-MCNC: 108 MG/DL (ref ?–200)
FOLATE SERPL-MCNC: 11.3 NG/ML (ref 8.6–58.9)
HCV AB SER IA-ACNC: <0.1 (ref 0–0.9)
HDLC SERPL-MCNC: 29 MG/DL (ref 40–?)
LDLC SERPL-MCNC: 55 MG/DL (ref ?–100)
OBSERVED RETIC COUNT: 3.22 % (ref 0.6–2.6)
TC:HDL: 3.7 RATIO
TRIGL SERPL-MCNC: 120 MG/DL (ref ?–150)
VIT B12 SERPL-MCNC: 714 PG/ML (ref 193–986)
VLDLC SERPL CALC-MCNC: 24 MG/DL (ref ?–40)

## 2021-03-21 NOTE — NUR
PT RESTING QUIETLY IN ROOM HEPARIN INFUSING, VSS, NO C/O PAIN, ASSESSMENT AS
CHARTED, WILL CON'T TO MONITOR PER PPOC.

## 2021-03-22 VITALS — SYSTOLIC BLOOD PRESSURE: 150 MMHG | DIASTOLIC BLOOD PRESSURE: 79 MMHG

## 2021-03-22 VITALS — SYSTOLIC BLOOD PRESSURE: 121 MMHG | DIASTOLIC BLOOD PRESSURE: 65 MMHG

## 2021-03-22 VITALS — SYSTOLIC BLOOD PRESSURE: 135 MMHG | DIASTOLIC BLOOD PRESSURE: 73 MMHG

## 2021-03-22 LAB
ALBUMIN SERPL-MCNC: 2.8 G/DL (ref 3.4–5)
ALT SERPL-CCNC: 459 U/L (ref 30–65)
AST SERPL-CCNC: 56 U/L (ref 15–37)
BILIRUB DIRECT SERPL-MCNC: 0.1 MG/DL
BILIRUB SERPL-MCNC: 0.4 MG/DL (ref 0.2–1)
CERULOPLASMIN SERPL-MCNC: 35.9 MG/DL (ref 16–31)
PROT SERPL-MCNC: 6.3 G/DL (ref 6.4–8.2)

## 2021-03-22 NOTE — 2DMMODE
[facility]
[address]
[place]   [address]                   2 D/M-MODE ECHOCARDIOGRAM     
_______________________________________________________________________________
 
Name:       VALERY AVENDAÑO                Room #:         204-P       [facility] IN  
M.R.#:      [id number]                       Account #:      [id number]  
Admission:  03/17/21    Attend Phys:    Priyanka Dias MD   
Discharge:              Date of Birth:  [birth year]  
                                                          Report #: [id number]
                                                                    [id number]
_______________________________________________________________________________
THIS REPORT FOR:  
 
cc:  Elizabeth Kesy MD, Cora A. MD Santiago, Patrick MD [facility]                                         
                                                                       ~
 
--------------- APPROVED REPORT --------------
 
 
Study performed:  03/22/2021 08:53:39
 
EXAM: Limited 2D, Doppler, and color-flow Echocardiogram with 
contrast 
Patient Location: Bedside   
Room #:  204     Status:  routine
 
      BSA:         2.41
HR: 59 bpm BP:          150/79 mmHg 
Rhythm: NSR     
 
Other Information 
Study Quality: Adequate
 
Indications
Limited follow up echo for right atrial thrombus.
Hx: Pulmonary embolism, CAD, stents.
 
Aortic Valve
AoV Peak Hany.:  1.22 m/s 
AO Peak Gr.:  5.92 mmHg  
 
Tricuspid Valve
TR Peak Hany.:  3.00 m/s  RAP Estimate:  5.00 mmHg
TR Peak Gr.:  36.05 mmHg 
    PA Pressure:  41.00 mmHg
 
Left Ventricle
The left ventricle is normal size. There is normal LV segmental wall 
motion. Left ventricular systolic function is normal. LVEF is 
55-60%.
 
Atria
Chiari network vs thrombus noted in the right atrium.
 
Aortic Valve
 
 
[facility]
[address]
[place]  [address]
Phone:  [phone]                    2 D/M-MODE ECHOCARDIOGRAM     
_______________________________________________________________________________
 
Name:            VALERY AVENDAÑO                Room #:        204-P       [facility] [place]
M.R.#:           [id number]          Account #:     [id number]  
Admission:       03/17/21         Attend Phys:   RUMA Cee
Discharge:                  Date of Birth: [birth year]  
                         Report #:      [id number]
        [id number]
_______________________________________________________________________________
The aortic valve is normal in structure. No aortic regurgitation is 
present. There is no aortic valvular stenosis.
 
Mitral Valve
The mitral valve is normal in structure. Mild mitral 
regurgitation.
 
Tricuspid Valve
The tricuspid valve is normal in structure. Mild tricuspid 
regurgitation. Estimated PAP is 40-45mmHg.
 
Great Vessels
IVC is normal in size and collapses >50% with 
inspiration.
 
Pericardium
There is no pericardial effusion.
 
<Conclusion>
Normal left ventricle size/wall thickness
Ejection fraction 55%
Mild right atrial enlargement
No significant valvular dysfunction detected by limited color-flow 
Doppler study
Echogenicity/mobile detected in the high right atrium, poorly seen 
but not as prominent 
as on previous transthoracic echocardiogram last week.
No pericardial effusion
 
 
 
 
 
 
 
 
 
 
 
 
 
 
 
 
  <ELECTRONICALLY SIGNED>
   By: Terrence Cantu MD, FACC    
  03/22/21 0931
D: 03/22/21 0931                           _____________________________________
T: 03/22/21 0931                           Terrence Cantu MD, FACC      /INF

## 2021-03-22 NOTE — NUR
SLEPT MOST OF SHIFT. DENIES COMPLAINTS OF CHEST PAIN OR SHORTNESS OF AIR THIS
SHIFT. UP AD DACIA IN ROOM WITH STEADY GAIT. PROGRESSING TOWARDS DISCHARGE GOALS
SLOWLY. WORKING ON GOALS AND PLAN OF CARE FOR NOC. MAINTAIN HEPARIN GTT PER
PROTOCOL. CONTINUE TO ASSES CLOSELY.

## 2021-03-22 NOTE — NUR
PT IS AXOX4, PLEASANT. HEPARIN GTT AT 18.26ML/HR. APTT 63.8 AT 0600, 60.2 AT
1200. HEPARIN D/C, START ORAL RX ELIQUIS. PT DENIES PAIN, SOA. LOW FALL RISK.
PT UP AD DACIA TO TOILET. DR PICHARDO CONSULTED. CARDIOLOGY CONSULTED. GI
CONSULTED. PT TO DISCHARGE TO HOME WITH WIFE. DISCHARGE EDUCATION CONDUCTED ON
RX, FOLLOW UP APPTS, PRECAUTIONS FOR BLEEDING WITH MEDICATION.

## 2021-03-23 LAB
MITOCHONDRIAL ANTIBODY: <20 UNITS (ref 0–20)
SMOOTH MUSCLE ANTIBODY: 7 UNITS (ref 0–19)

## 2021-03-30 ENCOUNTER — HOSPITAL ENCOUNTER (OUTPATIENT)
Dept: HOSPITAL 35 - SJCVC | Age: 66
End: 2021-03-30
Attending: INTERNAL MEDICINE
Payer: COMMERCIAL

## 2021-03-30 DIAGNOSIS — G47.33: ICD-10-CM

## 2021-03-30 DIAGNOSIS — Z95.5: ICD-10-CM

## 2021-03-30 DIAGNOSIS — Z79.84: ICD-10-CM

## 2021-03-30 DIAGNOSIS — E78.00: ICD-10-CM

## 2021-03-30 DIAGNOSIS — Z86.711: ICD-10-CM

## 2021-03-30 DIAGNOSIS — E11.9: ICD-10-CM

## 2021-03-30 DIAGNOSIS — I25.10: ICD-10-CM

## 2021-03-30 DIAGNOSIS — Z79.899: ICD-10-CM

## 2021-03-30 DIAGNOSIS — E78.49: ICD-10-CM

## 2021-03-30 DIAGNOSIS — E78.5: ICD-10-CM

## 2021-03-30 DIAGNOSIS — E66.9: ICD-10-CM

## 2021-03-30 DIAGNOSIS — Z79.82: ICD-10-CM

## 2021-03-30 DIAGNOSIS — I10: Primary | ICD-10-CM

## 2021-04-26 ENCOUNTER — HOSPITAL ENCOUNTER (OUTPATIENT)
Dept: HOSPITAL 35 - SJCVCIMAG | Age: 66
End: 2021-04-26
Attending: INTERNAL MEDICINE
Payer: COMMERCIAL

## 2021-04-26 DIAGNOSIS — Z79.84: ICD-10-CM

## 2021-04-26 DIAGNOSIS — G47.33: ICD-10-CM

## 2021-04-26 DIAGNOSIS — I07.1: Primary | ICD-10-CM

## 2021-04-26 DIAGNOSIS — I25.10: ICD-10-CM

## 2021-04-26 DIAGNOSIS — E11.9: ICD-10-CM

## 2021-04-26 DIAGNOSIS — Z79.899: ICD-10-CM

## 2021-04-26 DIAGNOSIS — J44.9: ICD-10-CM

## 2021-04-26 DIAGNOSIS — Z95.1: ICD-10-CM

## 2021-04-26 DIAGNOSIS — E66.9: ICD-10-CM

## 2021-04-26 DIAGNOSIS — Z95.5: ICD-10-CM

## 2021-04-26 DIAGNOSIS — E78.00: ICD-10-CM

## 2021-04-26 DIAGNOSIS — E78.5: ICD-10-CM

## 2021-04-26 DIAGNOSIS — Z95.828: ICD-10-CM

## 2021-04-26 DIAGNOSIS — Z79.82: ICD-10-CM

## 2021-04-26 DIAGNOSIS — I10: ICD-10-CM

## 2021-04-26 DIAGNOSIS — Z82.49: ICD-10-CM

## 2021-06-21 ENCOUNTER — HOSPITAL ENCOUNTER (OUTPATIENT)
Dept: HOSPITAL 35 - SJCVCIMAG | Age: 66
End: 2021-06-21
Attending: RADIOLOGY
Payer: COMMERCIAL

## 2021-06-21 DIAGNOSIS — E11.9: ICD-10-CM

## 2021-06-21 DIAGNOSIS — M79.661: ICD-10-CM

## 2021-06-21 DIAGNOSIS — Z79.82: ICD-10-CM

## 2021-06-21 DIAGNOSIS — I26.99: Primary | ICD-10-CM

## 2021-06-21 DIAGNOSIS — E66.9: ICD-10-CM

## 2021-06-21 DIAGNOSIS — G47.33: ICD-10-CM

## 2021-06-21 DIAGNOSIS — I82.409: ICD-10-CM

## 2021-06-21 DIAGNOSIS — I51.0: ICD-10-CM

## 2021-06-21 DIAGNOSIS — Z79.899: ICD-10-CM

## 2021-06-21 DIAGNOSIS — I25.10: ICD-10-CM

## 2021-06-21 DIAGNOSIS — E78.00: ICD-10-CM

## 2021-06-21 DIAGNOSIS — Z79.84: ICD-10-CM

## 2021-06-21 DIAGNOSIS — I51.3: ICD-10-CM

## 2021-06-21 DIAGNOSIS — R06.09: ICD-10-CM

## 2021-07-26 ENCOUNTER — HOSPITAL ENCOUNTER (OUTPATIENT)
Dept: HOSPITAL 35 - SJCVC | Age: 66
End: 2021-07-26
Attending: INTERNAL MEDICINE
Payer: COMMERCIAL

## 2021-07-26 DIAGNOSIS — E11.9: ICD-10-CM

## 2021-07-26 DIAGNOSIS — Z79.899: ICD-10-CM

## 2021-07-26 DIAGNOSIS — Z79.82: ICD-10-CM

## 2021-07-26 DIAGNOSIS — R06.09: ICD-10-CM

## 2021-07-26 DIAGNOSIS — E78.00: ICD-10-CM

## 2021-07-26 DIAGNOSIS — E78.5: ICD-10-CM

## 2021-07-26 DIAGNOSIS — I25.10: ICD-10-CM

## 2021-07-26 DIAGNOSIS — E66.9: ICD-10-CM

## 2021-07-26 DIAGNOSIS — G47.33: ICD-10-CM

## 2021-07-26 DIAGNOSIS — E66.01: ICD-10-CM

## 2021-07-26 DIAGNOSIS — I10: Primary | ICD-10-CM

## 2021-09-22 ENCOUNTER — HOSPITAL ENCOUNTER (OUTPATIENT)
Dept: HOSPITAL 35 - SJCVCIMAG | Age: 66
End: 2021-09-22
Attending: RADIOLOGY
Payer: COMMERCIAL

## 2021-09-22 DIAGNOSIS — I26.99: ICD-10-CM

## 2021-09-22 DIAGNOSIS — E78.00: ICD-10-CM

## 2021-09-22 DIAGNOSIS — Z79.82: ICD-10-CM

## 2021-09-22 DIAGNOSIS — G47.33: ICD-10-CM

## 2021-09-22 DIAGNOSIS — I51.3: ICD-10-CM

## 2021-09-22 DIAGNOSIS — I25.10: ICD-10-CM

## 2021-09-22 DIAGNOSIS — I82.409: Primary | ICD-10-CM

## 2021-09-22 DIAGNOSIS — M79.604: ICD-10-CM

## 2021-09-22 DIAGNOSIS — M79.605: ICD-10-CM

## 2021-09-22 DIAGNOSIS — Z79.899: ICD-10-CM

## 2021-10-07 ENCOUNTER — HOSPITAL ENCOUNTER (OUTPATIENT)
Dept: HOSPITAL 35 - SJCVCIMAG | Age: 66
End: 2021-10-07
Attending: INTERNAL MEDICINE
Payer: COMMERCIAL

## 2021-10-07 DIAGNOSIS — I65.23: Primary | ICD-10-CM

## 2022-02-16 ENCOUNTER — HOSPITAL ENCOUNTER (OUTPATIENT)
Dept: HOSPITAL 35 - SJCVC | Age: 67
End: 2022-02-16
Attending: INTERNAL MEDICINE
Payer: COMMERCIAL

## 2022-02-16 DIAGNOSIS — Z79.899: ICD-10-CM

## 2022-02-16 DIAGNOSIS — R94.31: Primary | ICD-10-CM

## 2022-02-16 DIAGNOSIS — Z79.84: ICD-10-CM

## 2022-02-16 DIAGNOSIS — Z95.5: ICD-10-CM

## 2022-02-16 DIAGNOSIS — E78.00: ICD-10-CM

## 2022-02-16 DIAGNOSIS — G47.33: ICD-10-CM

## 2022-02-16 DIAGNOSIS — I10: ICD-10-CM

## 2022-02-16 DIAGNOSIS — E11.9: ICD-10-CM

## 2022-02-16 DIAGNOSIS — E78.5: ICD-10-CM

## 2022-02-16 DIAGNOSIS — Z82.49: ICD-10-CM

## 2022-02-16 DIAGNOSIS — Z95.818: ICD-10-CM

## 2022-02-16 DIAGNOSIS — I25.10: ICD-10-CM

## 2022-02-16 DIAGNOSIS — J45.909: ICD-10-CM

## 2022-02-16 DIAGNOSIS — Z79.82: ICD-10-CM
